# Patient Record
Sex: FEMALE | Race: ASIAN | NOT HISPANIC OR LATINO
[De-identification: names, ages, dates, MRNs, and addresses within clinical notes are randomized per-mention and may not be internally consistent; named-entity substitution may affect disease eponyms.]

---

## 2022-12-09 PROBLEM — Z00.00 ENCOUNTER FOR PREVENTIVE HEALTH EXAMINATION: Status: ACTIVE | Noted: 2022-12-09

## 2022-12-16 ENCOUNTER — APPOINTMENT (OUTPATIENT)
Dept: PEDIATRICS | Facility: CLINIC | Age: 32
End: 2022-12-16

## 2022-12-16 DIAGNOSIS — Z71.9 COUNSELING, UNSPECIFIED: ICD-10-CM

## 2022-12-16 PROCEDURE — 99202 OFFICE O/P NEW SF 15 MIN: CPT

## 2022-12-16 NOTE — DISCUSSION/SUMMARY
[FreeTextEntry1] : MET WITH DR. BUCK AND MYSELF\par ALL PARENTS QUESTIONS ANSWERED\par REGISTRATION PACKET PROVIDED

## 2022-12-16 NOTE — HISTORY OF PRESENT ILLNESS
[New - [facility] Care] : a new patient visit to establish care [FreeTextEntry6] : 32 YEAR OLD FIRST TIME MOM, DELIVERING AT [facility]\par REFERRED BY A FAMILY FRIEND\par